# Patient Record
(demographics unavailable — no encounter records)

---

## 2024-10-23 NOTE — ASSESSMENT
[FreeTextEntry1] : Assessment and Plan: - Cough (R05) : Coughing is the chief complaint of the patient. It seems to be a recurrent symptom and might be related to her underlying respiratory issues. The patient's lack of significant smoking history and other symptoms makes it less likely that the cough is related to serious pulmonary conditions such as lung cancer. Her previous imaging study shows minor spots in the lungs, which may be associated with her diagnosed rheumatologic disorder, gematomyositis. - Therapeutic Interventions: Continue with regular use of Dulair and refill Albuterol prescription immediately. - Diagnostic Tests: A follow-up CAT scan has been suggested to be done in the coming months to assess changes in the lung nodules.  - Patient Education: Explained to patient the importance of keeping her medications updated, and the need for a follow-up CAT scan.  - Follow-Up: Appointment to be made in spring for regular follow-up. Patient has been advised to report any changes or wShe will f/u with cardiology.orsening of her condition immediately.

## 2024-10-23 NOTE — HISTORY OF PRESENT ILLNESS
[TextBox_4] : - Summary : The patient, Nita Epps, came in for a regular follow-up visit and is primarily complaining of persistent coughing. She does not report any significant nose congestion or other cold-like symptoms. - Chief Complaint (CC) : The main complaint of the patient is persistent coughing. She does not report any other significant respiratory or systemic symptoms at present. - History of Present Illness : Ms. Epps suffers from long-term respiratory issues. She has been using Dulair regularly, but recently ran out of albuterol (also known by the brand name Proventil). She reports being a bit 'under the weather,' mainly characterized by continued coughing. She denies having nasal congestion or other allergy-like symptoms. The patient also shares that she is diagnosed with a rheumatologic disorder, dermatomyositis. In a previous imaging study (CAT scan) performed in April, a few spots were noted in her lungs. The patient denies any history of significant smoking. - Past Medical History : The patient has a history of rheumatologic disorder, gematomyositis which might be related to her current respiratory issues. Further details pertaining to her past medical history are not provided within the provided transcription.

## 2024-11-01 NOTE — PHYSICAL EXAM
[Well Developed] : well developed [Well Nourished] : well nourished [No Acute Distress] : no acute distress [Normal Venous Pressure] : normal venous pressure [Normal] : clear lung fields, good air entry, no respiratory distress [Edema ___] : edema [unfilled]

## 2024-11-01 NOTE — ASSESSMENT
[FreeTextEntry1] : Patient has NYHA  class 3 symptoms. She is on three agents, and there was no evidence of RV dysfunction on TTE 10/2023. She reports feeling better compared to previous appt.  Recent RHC showed: PA 45/14/29 mmHg; PAWP 14 mmHg, CO/CI 9.6/4.6 (TD) with PVR 1.5 GALLO, no e/o L-R shunt. This doesn't explain her limitations and hypoxia. No significant e/o lung parenchymal disease on CT chest.    - Pulmonary Arterial Hypertension / Severe obstruction/ Obesity   WHO FC class III Reveal score 3, low risk  6 MWT 11/1- 187 meters SpO2 80% on 4 LPM 6MWT 6/21/2024 - 157 m in 4 minutes. Patient was unable to complete exam due to hypoxia and fatigue. 78% on 3L/min was lowest SaO2 with exertion.  6MWT  3/- 152 m in 4 minutes. Patient was unable to complete exam due to hypoxia and fatigue. 79% O2 on 6 L/min  Euvolemic on exam, IVC is 1.9 cm, collapsible Torsemide 20 mg once a week, with weight gain of 2 lbs take an extra dose.  Continue Uptravi 600 mg BID, unable to tolerate increase to 800 mg BID on two occasions in the past Continue Adempas 2.5 mg TID  Continue Opsumit 10 mg daily  PFTs with very severe obstruction, follow up with pulmonary  Going for CT chest per pulm to follow up on nodules reported on previous CT. Will discuss referral for lung transplant evaluation  Continue O2 therapy 24/7 Monitor blood pressure and weight at home Follow up with Pulm and rheumatology Referral to pulmonary rehab Blood today RTO in 2 months with labs   Dinora Mendez MD, FACC, [facility]  Advanced Heart Failure/ Mechanical Circulatory Support Pulmonary Hypertension and Cardiac Amyloidosis  [facility]

## 2024-11-01 NOTE — HISTORY OF PRESENT ILLNESS
[FreeTextEntry1] : 62 y/o F with h/o PAH, CAD s/p stent, obesity, COVID-19, sleep apnea, on home 02, coming today for follow up of pulmonary arterial HTN. She was admitted to [facility] from 9/16 to 9/26 for COPD exacerbation, she required intubation on 9/16 for CO2 retention, extubated 9/19. Here today for a follow-up.   Patient is coming for routine visit. RHC done 3/26/2024 showed: /79/104, RA 6, PA 45/14/29, PAWP 14, CO/CI 9.6/4.6, PVR 1.5.  Her Uptravi was increased to 60 0 mg BID since then which she started only a few days ago. She denies any side effects and reports a slight improvement in her breathing.   A recent CT chest done 4/6/2024 showed multiple bilateral pulmonary nodules- Patent central tracheobronchial tree, no e/o parenchymal mass or pleural effusion, no bronchiectasis or honeycoming. Repeat CT in 3 months recommended.  PFTs done March 2024 showed very severe obstruction with air trapping  Patient reports being stable at baseline, she walks around in her apartment with no major limitations. Her major difficulty is when she has to walk outside, especially walking uphill. No chest pain, LOC, N/V reported.   06/21/2024: Patient is here for F/U visit. States that her dyspnea has slightly improved. She walked today from the hospital's main entrance to [address] [place] [address] by herself on O2 and felt ok. She complains of occasional headaches from Uptravi, which improves with Tylenol. No c/o chest pain, LOC, N/V, bleeding. Her weight is stable 212-218 lbs, she takes furosemide 20 mg once a week and extra for weight gain of 2 lbs in one day. She is enrolled in pulmonary rehab and feels her ET has improved already; she has done 10 sessions. She is on 3 agents for PAH: Adempas 2.5 mg TID, Opsumit 10 mg daily and Uptravi 600 mg TID.  RHC done March 26th showed PA 45/14/29 mmHg; PAWP 14 mmHg, CO/CI 9.6/4.6 (TD) with PVR 1.5 GALLO; after this her uptravi was increased. Most recent labs: Cr 0.71, Hg 11.1/36.9, Mg 2.0 proBNP<36.   11/01/2024: Patient presents for follow up on management of pulmonary hypertension. She is on continuous O2 2L/min at rest and 3L/min with activity.  She walks around the house, uses a wheel[place]chair in shopping [place], unable to go uphill. She has been sick with common cold for the last week and stopped going to pulmonary rehab. Patient is willing to resume once she recovers from "cold". She had an episode of SOB last week which patient attributes to changes in weather, recovered with higher flow of O2 and rest. She tolerates Opsumit, Uptravi, Adempas well. Unable to uptitrate Uptravi further due to side effects. Her weight has been stable on Furosemide 20 mg three times a week. Euvolemic on exam.

## 2024-11-01 NOTE — REVIEW OF SYSTEMS
[Dyspnea on exertion] : dyspnea during exertion [Lower Ext Edema] : lower extremity edema [Negative] : Gastrointestinal

## 2024-11-01 NOTE — CARDIOLOGY SUMMARY
[de-identified] : V/Q scan - 01- - Low probability for PE \par  Rheumatologic work up: negative for SLE, Sjogren, Scleroderma, dermatomyositis \par  HIV negative \par  CT chest: No e/o ILD \par  RHC- 2/4/2020 \par  RA 9 mmHg \par  PA: 72/35/53 mmHg \par  PCWP 14 mmHg - saturation 84%- \par  CO/CI 5.73/3.41 \par  PVR: 6.8 GALLO \par  \par  Post Fran at 80 ppm \par  \par  PA 48/26/40\par  PCWP 16\par  CO/CI 6.6\par  PVR 3.6 GALLO

## 2025-02-07 NOTE — CARDIOLOGY SUMMARY
[de-identified] : V/Q scan - 01- - Low probability for PE \par  Rheumatologic work up: negative for SLE, Sjogren, Scleroderma, dermatomyositis \par  HIV negative \par  CT chest: No e/o ILD \par  RHC- 2/4/2020 \par  RA 9 mmHg \par  PA: 72/35/53 mmHg \par  PCWP 14 mmHg - saturation 84%- \par  CO/CI 5.73/3.41 \par  PVR: 6.8 GALLO \par  \par  Post Fran at 80 ppm \par  \par  PA 48/26/40\par  PCWP 16\par  CO/CI 6.6\par  PVR 3.6 GALLO

## 2025-02-07 NOTE — REVIEW OF SYSTEMS
[Dyspnea on exertion] : dyspnea during exertion [Lower Ext Edema] : lower extremity edema [Negative] : Neurological

## 2025-02-07 NOTE — HISTORY OF PRESENT ILLNESS
[FreeTextEntry1] : 64 y/o F with h/o PAH, CAD s/p stent, obesity, COVID-19, sleep apnea, on home 02, coming today for follow up of pulmonary arterial HTN. She was admitted to [facility] from 9/16 to 9/26 for COPD exacerbation, she required intubation on 9/16 for CO2 retention, extubated 9/19. Here today for a follow-up.   Patient is coming for routine visit. RHC done 3/26/2024 showed: /79/104, RA 6, PA 45/14/29, PAWP 14, CO/CI 9.6/4.6, PVR 1.5.  Her Uptravi was increased to 60 0 mg BID since then which she started only a few days ago. She denies any side effects and reports a slight improvement in her breathing.   A recent CT chest done 4/6/2024 showed multiple bilateral pulmonary nodules- Patent central tracheobronchial tree, no e/o parenchymal mass or pleural effusion, no bronchiectasis or honeycoming. Repeat CT in 3 months recommended.  PFTs done March 2024 showed very severe obstruction with air trapping  Patient reports being stable at baseline, she walks around in her apartment with no major limitations. Her major difficulty is when she has to walk outside, especially walking uphill. No chest pain, LOC, N/V reported.   06/21/2024: Patient is here for F/U visit. States that her dyspnea has slightly improved. She walked today from the hospital's main entrance to [address] [place] [address] by herself on O2 and felt ok. She complains of occasional headaches from Uptravi, which improves with Tylenol. No c/o chest pain, LOC, N/V, bleeding. Her weight is stable 212-218 lbs, she takes furosemide 20 mg once a week and extra for weight gain of 2 lbs in one day. She is enrolled in pulmonary rehab and feels her ET has improved already; she has done 10 sessions. She is on 3 agents for PAH: Adempas 2.5 mg TID, Opsumit 10 mg daily and Uptravi 600 mg TID.  RHC done March 26th showed PA 45/14/29 mmHg; PAWP 14 mmHg, CO/CI 9.6/4.6 (TD) with PVR 1.5 GALLO; after this her uptravi was increased. Most recent labs: Cr 0.71, Hg 11.1/36.9, Mg 2.0 proBNP<36.   11/01/2024: Patient presents for follow up on management of pulmonary hypertension. She is on continuous O2 2L/min at rest and 3L/min with activity.  She walks around the house, uses a wheel[place]chair in shopping [place], unable to go uphill. She has been sick with common cold for the last week and stopped going to pulmonary rehab. Patient is willing to resume once she recovers from "cold". She had an episode of SOB last week which patient attributes to changes in weather, recovered with higher flow of O2 and rest. She tolerates Opsumit, Uptravi, Adempas well. Unable to uptitrate Uptravi further due to side effects. Her weight has been stable on Furosemide 20 mg three times a week. Euvolemic on exam.  02/07/2025: Patient presents to follow up clinic for management of pulmonary arterial hypertension. She had viral infection 2 weeks ago which she has almost recovered from. She complains of short-lived episodes occurring 2-3 times a week when she starts coughing and can't catch her breath; after 2-3 minutes she is back to her normal. She has not resumed rehab yet due to multiple family events and recent illness. She has very limited physical activity, walks around the house. She is on O2 2-3L/min via NC, 4L/min with walking. She has not gone for chest CT yet. Hg 10.9 Cr 0.7 K 4.6 pro-BNP <36

## 2025-02-07 NOTE — ASSESSMENT
[FreeTextEntry1] : Patient has NYHA  class 3 symptoms. She is on three agents, and there was no evidence of RV dysfunction on TTE 10/2023. She reports feeling better compared to previous appt.  Recent RHC showed: PA 45/14/29 mmHg; PAWP 14 mmHg, CO/CI 9.6/4.6 (TD) with PVR 1.5 GALLO, no e/o L-R shunt. This doesn't explain her limitations and hypoxia. No significant e/o lung parenchymal disease on CT chest.    - Pulmonary Arterial Hypertension / Severe obstruction/ Obesity   WHO FC class III  6 MWT 11/1- 187 meters SpO2 80% on 4 LPM 6MWT 6/21/2024 - 157 m in 4 minutes. Patient was unable to complete exam due to hypoxia and fatigue. 78% on 3L/min was lowest SaO2 with exertion.  6MWT  3/- 152 m in 4 minutes. Patient was unable to complete exam due to hypoxia and fatigue. 79% O2 on 6 L/min 6MWT 02/07/2025- 142 m , patient walked for 5 min 10 sec with a stop of 1 min 10 sec, lowest SaO2 84% on 3L/min with exertion  Euvolemic on exam, IVC is <2.0cm, collapsible Continue Torsemide 20 mg PRN Continue Uptravi 600 mg BID, unable to tolerate increase to 800 mg BID on two occasions in the past. Will start another trial for higher dose after TTE Continue Adempas 2.5 mg TID  Continue Opsumit 10 mg daily  PFTs with very severe obstruction, follow up with pulmonary  Going for CT chest per pulm to follow up on nodules reported on previous CT. Continue O2 therapy 24/7 Monitor blood pressure and weight at home Follow up with Pulm and rheumatology Referral to pulmonary rehab Blood work TTE soon next appt  RTO in 3 months with labs   Dinora Mendez MD, FACC, [facility]  Advanced Heart Failure/ Mechanical Circulatory Support Pulmonary Hypertension and Cardiac Amyloidosis  [facility]

## 2025-02-07 NOTE — PHYSICAL EXAM
[Well Developed] : well developed [Well Nourished] : well nourished [No Acute Distress] : no acute distress [Normal Venous Pressure] : normal venous pressure [Normal] : clear lung fields, good air entry, no respiratory distress [Edema ___] : edema [unfilled] [Moves all extremities] : moves all extremities [Alert and Oriented] : alert and oriented [Normal memory] : normal memory

## 2025-02-14 NOTE — ASSESSMENT
[FreeTextEntry1] : Assessment and Plan: -  : Multiple pulmonary nodules. - Diagnostic Tests: advises a repeat CAT now scan to monitor pulmonary nodules. They may be related to the previous illness at the time of the previous scan v. rheum v. neoplasia  - : Sleep apnea possibly not well controlled. - Investigate if current CPAP settings need to be readjusted. - Possibly repeating the sleep test when pt agrees.  - : Fatigue, and eye related issues - likely related to rheum condition - f/u optho - Consult rheumatologist regarding the possibility of taking natural herbs. - Refer to dermatologist for further examination of eye issues.  - Possibly get a biopsy of her eyelids.    f/u after CT

## 2025-02-14 NOTE — REASON FOR VISIT
[Follow-Up] : a follow-up visit [COPD] : COPD [Bronchiectasis] : bronchiectasis [Pulmonary Nodules] : pulmonary nodules

## 2025-02-14 NOTE — HISTORY OF PRESENT ILLNESS
[TextBox_4] : - Summary : Patient presents again for follow-up of multiple ongoing issues. - Chief Complaint (CC) : The patient's main concern during this visit is general fatigue and concerns about her current medication. Patient is also due for a repeat CAT scan to evaluate previously detected pulmonary nodules. - History of Present Illness : Nita Epps, a patient with a history of COPD, dermatomyositis, and chronic oxygen therapy comes in today for a follow-up visit. Ms. Epps has previously diagnosed multiple pulmonary nodules and she is currently overdue for a repeat CAT scan. Last time when the scan was done, she was not feeling well with a respiratory condition. She has been generally feeling okay but reports increased fatigue. She also mentions concerns about her current medication and has been considering taking natural herbs,. Additionally, she mentions experiencing symptoms that may indicate issues with her current sleep apnea treatment. She is also experiencing eye issues, including reduced vision and continuous yellow mucus discharge.

## 2025-05-16 NOTE — ASSESSMENT
[FreeTextEntry1] : Patient has NYHA  class 3 symptoms. She is on three agents, and there was no evidence of RV dysfunction on TTE 10/2023. She reports feeling better compared to previous appt.  RHC 3/2024 showed: PA 45/14/29 mmHg; PAWP 14 mmHg, CO/CI 9.6/4.6 (TD) with PVR 1.5 GALLO, no e/o L-R shunt. This doesn't explain her limitations and hypoxia. No significant e/o lung parenchymal disease on CT chest.    - Pulmonary Arterial Hypertension / Severe obstruction/ Obesity   WHO FC class III  6 MWT 11/1- 187 meters SpO2 80% on 4 LPM 6MWT 6/21/2024 - 157 m in 4 minutes. Patient was unable to complete exam due to hypoxia and fatigue. 78% on 3L/min was lowest SaO2 with exertion.  6MWT  3/- 152 m in 4 minutes. Patient was unable to complete exam due to hypoxia and fatigue. 79% O2 on 6 L/min 6MWT 02/07/2025- 142 m , patient walked for 5 min 10 sec with a stop of 1 min 10 sec, lowest SaO2 84% on 3L/min with exertion   Patient is slightly overloaded on exam  Take furosemide daily for three days then continue with one tab weekly and extra as needed for weight gain of 2 lbs in one  day Continue Uptravi 600 mg BID, unable to tolerate increase to 800 mg BID on two occasions in the past. Will start another trial for higher dose after TTE Continue Adempas 2.5 mg TID  Continue Opsumit 10 mg daily  PFTs with very severe obstruction, follow up with pulmonary  Going for CT chest per pulm to follow up on nodules reported on previous CT. Continue O2 therapy 24/7 Monitor blood pressure and weight at home Follow up with Pulm and rheumatology Referral to pulmonary rehab She might benefit at this point from evaluation for lung transplant, she will think about it and discuss with her family RTO in 3 months with labs   Dinora Mendez MD, FACC, [facility]  Advanced Heart Failure/ Mechanical Circulatory Support Pulmonary Hypertension and Cardiac Amyloidosis  [facility]

## 2025-05-16 NOTE — CARDIOLOGY SUMMARY
[de-identified] : V/Q scan - 01- - Low probability for PE \par  Rheumatologic work up: negative for SLE, Sjogren, Scleroderma, dermatomyositis \par  HIV negative \par  CT chest: No e/o ILD \par  RHC- 2/4/2020 \par  RA 9 mmHg \par  PA: 72/35/53 mmHg \par  PCWP 14 mmHg - saturation 84%- \par  CO/CI 5.73/3.41 \par  PVR: 6.8 GALLO \par  \par  Post Fran at 80 ppm \par  \par  PA 48/26/40\par  PCWP 16\par  CO/CI 6.6\par  PVR 3.6 GALLO

## 2025-05-16 NOTE — HISTORY OF PRESENT ILLNESS
[FreeTextEntry1] : 64 y/o F with h/o PAH, CAD s/p stent, obesity, COVID-19, sleep apnea, on home 02, coming today for follow up of pulmonary arterial HTN. She was admitted to [facility] from 9/16 to 9/26 for COPD exacerbation, she required intubation on 9/16 for CO2 retention, extubated 9/19. Here today for a follow-up.   Patient is coming for routine visit. RHC done 3/26/2024 showed: /79/104, RA 6, PA 45/14/29, PAWP 14, CO/CI 9.6/4.6, PVR 1.5.  Her Uptravi was increased to 60 0 mg BID since then which she started only a few days ago. She denies any side effects and reports a slight improvement in her breathing.   A recent CT chest done 4/6/2024 showed multiple bilateral pulmonary nodules- Patent central tracheobronchial tree, no e/o parenchymal mass or pleural effusion, no bronchiectasis or honeycoming. Repeat CT in 3 months recommended.  PFTs done March 2024 showed very severe obstruction with air trapping  Patient reports being stable at baseline, she walks around in her apartment with no major limitations. Her major difficulty is when she has to walk outside, especially walking uphill. No chest pain, LOC, N/V reported.   06/21/2024: Patient is here for F/U visit. States that her dyspnea has slightly improved. She walked today from the hospital's main entrance to [address] [place] [address] by herself on O2 and felt ok. She complains of occasional headaches from Uptravi, which improves with Tylenol. No c/o chest pain, LOC, N/V, bleeding. Her weight is stable 212-218 lbs, she takes furosemide 20 mg once a week and extra for weight gain of 2 lbs in one day. She is enrolled in pulmonary rehab and feels her ET has improved already; she has done 10 sessions. She is on 3 agents for PAH: Adempas 2.5 mg TID, Opsumit 10 mg daily and Uptravi 600 mg TID.  RHC done March 26th showed PA 45/14/29 mmHg; PAWP 14 mmHg, CO/CI 9.6/4.6 (TD) with PVR 1.5 GALLO; after this her uptravi was increased. Most recent labs: Cr 0.71, Hg 11.1/36.9, Mg 2.0 proBNP<36.   11/01/2024: Patient presents for follow up on management of pulmonary hypertension. She is on continuous O2 2L/min at rest and 3L/min with activity.  She walks around the house, uses a wheel[place] in [place], unable to go uphill. She has been sick with common cold for the last week and stopped going to pulmonary rehab. Patient is willing to resume once she recovers from "cold". She had an episode of SOB last week which patient attributes to changes in weather, recovered with higher flow of O2 and rest. She tolerates Opsumit, Uptravi, Adempas well. Unable to uptitrate Uptravi further due to side effects. Her weight has been stable on Furosemide 20 mg three times a week. Euvolemic on exam.  02/07/2025: Patient presents to follow up clinic for management of pulmonary arterial hypertension. She had viral infection 2 weeks ago which she has almost recovered from. She complains of short-lived episodes occurring 2-3 times a week when she starts coughing and can't catch her breath; after 2-3 minutes she is back to her normal. She has not resumed rehab yet due to multiple family events and recent illness. She has very limited physical activity, walks around the house. She is on O2 2-3L/min via NC, 4L/min with walking. She has not gone for chest CT yet. Hg 10.9 Cr 0.7 K 4.6 pro-BNP <36  05/14/2025: Follow up clinic for PAH. She reports some worsening in ET and dyspnea with exertion. TTE from 02/18/2025 showed an improvement in PASP 20 mmHg, euvolemic state and preserved EF. Today, however, patient is hypervolemic, she admits to eating take out yesterday. IVC 2.2 cm collapsing <50%. She gained weight over the last 2 weeks, 8 lbs weight gain since last visit. She takes Furosemide 20 mg weekly, last dose 6 days ago.  No recent blood work. On O2 continuously 3L/min. She needs a motorized wheelchair because her condition is getting worse and her  is not able to push a regular wheelchair due to musculoskeletal issues.

## 2025-05-16 NOTE — HISTORY OF PRESENT ILLNESS
[FreeTextEntry1] : 62 y/o F with h/o PAH, CAD s/p stent, obesity, COVID-19, sleep apnea, on home 02, coming today for follow up of pulmonary arterial HTN. She was admitted to [facility] from 9/16 to 9/26 for COPD exacerbation, she required intubation on 9/16 for CO2 retention, extubated 9/19. Here today for a follow-up.   Patient is coming for routine visit. RHC done 3/26/2024 showed: /79/104, RA 6, PA 45/14/29, PAWP 14, CO/CI 9.6/4.6, PVR 1.5.  Her Uptravi was increased to 60 0 mg BID since then which she started only a few days ago. She denies any side effects and reports a slight improvement in her breathing.   A recent CT chest done 4/6/2024 showed multiple bilateral pulmonary nodules- Patent central tracheobronchial tree, no e/o parenchymal mass or pleural effusion, no bronchiectasis or honeycoming. Repeat CT in 3 months recommended.  PFTs done March 2024 showed very severe obstruction with air trapping  Patient reports being stable at baseline, she walks around in her apartment with no major limitations. Her major difficulty is when she has to walk outside, especially walking uphill. No chest pain, LOC, N/V reported.   06/21/2024: Patient is here for F/U visit. States that her dyspnea has slightly improved. She walked today from the hospital's main entrance to [address] [place] [address] by herself on O2 and felt ok. She complains of occasional headaches from Uptravi, which improves with Tylenol. No c/o chest pain, LOC, N/V, bleeding. Her weight is stable 212-218 lbs, she takes furosemide 20 mg once a week and extra for weight gain of 2 lbs in one day. She is enrolled in pulmonary rehab and feels her ET has improved already; she has done 10 sessions. She is on 3 agents for PAH: Adempas 2.5 mg TID, Opsumit 10 mg daily and Uptravi 600 mg TID.  RHC done March 26th showed PA 45/14/29 mmHg; PAWP 14 mmHg, CO/CI 9.6/4.6 (TD) with PVR 1.5 GALLO; after this her uptravi was increased. Most recent labs: Cr 0.71, Hg 11.1/36.9, Mg 2.0 proBNP<36.   11/01/2024: Patient presents for follow up on management of pulmonary hypertension. She is on continuous O2 2L/min at rest and 3L/min with activity.  She walks around the house, [place] a wheel[place] in [place], unable to go uphill. She has been sick with common cold for the last week and stopped going to pulmonary rehab. Patient is willing to resume once she recovers from "cold". She had an episode of SOB last week which patient attributes to changes in weather, recovered with higher flow of O2 and rest. She tolerates Opsumit, Uptravi, Adempas well. Unable to uptitrate Uptravi further due to side effects. Her weight has been stable on Furosemide 20 mg three times a week. Euvolemic on exam.  02/07/2025: Patient presents to follow up clinic for management of pulmonary arterial hypertension. She had viral infection 2 weeks ago which she has almost recovered from. She complains of short-lived episodes occurring 2-3 times a week when she starts coughing and can't catch her breath; after 2-3 minutes she is back to her normal. She has not resumed rehab yet due to multiple family events and recent illness. She has very limited physical activity, walks around the house. She is on O2 2-3L/min via NC, 4L/min with walking. She has not gone for chest CT yet. Hg 10.9 Cr 0.7 K 4.6 pro-BNP <36  05/14/2025: Follow up clinic for PAH. She reports some worsening in ET and dyspnea with exertion. TTE from 02/18/2025 showed an improvement in PASP 20 mmHg, euvolemic state and preserved EF. Today, however, patient is hypervolemic, she admits to eating take out yesterday. IVC 2.2 cm collapsing <50%. She gained weight over the last 2 weeks, 8 lbs weight gain since last visit. She takes Furosemide 20 mg weekly, last dose 6 days ago.  No recent blood work. On O2 continuously 3L/min. She needs a motorized wheelchair because her condition is getting worse and her  is not able to push a regular wheelchair due to musculoskeletal issues.

## 2025-05-16 NOTE — ASSESSMENT
[FreeTextEntry1] : Patient has NYHA  class 3 symptoms. She is on three agents, and there was no evidence of RV dysfunction on TTE 10/2023. She reports feeling better compared to previous appt.  RHC 3/2024 showed: PA 45/14/29 mmHg; PAWP 14 mmHg, CO/CI 9.6/4.6 (TD) with PVR 1.5 GALLO, no e/o L-R shunt. This doesn't explain her limitations and hypoxia. No significant e/o lung parenchymal disease on CT chest.    - Pulmonary Arterial Hypertension / Severe obstruction/ Obesity   WHO FC class III  6 MWT 11/1- 187 meters SpO2 80% on 4 LPM 6MWT 6/21/2024 - 157 m in 4 minutes. Patient was unable to complete exam due to hypoxia and fatigue. 78% on 3L/min was lowest SaO2 with exertion.  6MWT  3/- 152 m in 4 minutes. Patient was unable to complete exam due to hypoxia and fatigue. 79% O2 on 6 L/min 6MWT 02/07/2025- 142 m , patient walked for 5 min 10 sec with a stop of 1 min 10 sec, lowest SaO2 84% on 3L/min with exertion   Patient is slightly overloaded on exam  Take furosemide daily for three days then continue with one tab weekly and extra as needed for weight gain of 2 lbs in one  day Continue Uptravi 600 mg BID, unable to tolerate increase to 800 mg BID on two occasions in the past. Will start another trial for higher dose after TTE Continue Adempas 2.5 mg TID  Continue Opsumit 10 mg daily  PFTs with very severe obstruction, follow up with pulmonary  Going for CT chest per pulm to follow up on nodules reported on previous CT. Continue O2 therapy 24/7 Monitor blood pressure and weight at home Follow up with Pulm and rheumatology Referral to pulmonary rehab She might benefit at this point from evaluation for lung transplant, she will think about it and discuss with her family RTO in 3 months with labs   Dinora Mendez MD, FACC, [facility]  Advanced Heart Failure/ Mechanical Circulatory Support Pulmonary Hypertension and Cardiac Amyloidosis  [facility]

## 2025-05-16 NOTE — CARDIOLOGY SUMMARY
[de-identified] : V/Q scan - 01- - Low probability for PE \par  Rheumatologic work up: negative for SLE, Sjogren, Scleroderma, dermatomyositis \par  HIV negative \par  CT chest: No e/o ILD \par  RHC- 2/4/2020 \par  RA 9 mmHg \par  PA: 72/35/53 mmHg \par  PCWP 14 mmHg - saturation 84%- \par  CO/CI 5.73/3.41 \par  PVR: 6.8 GALLO \par  \par  Post Fran at 80 ppm \par  \par  PA 48/26/40\par  PCWP 16\par  CO/CI 6.6\par  PVR 3.6 GALLO

## 2025-05-16 NOTE — CARDIOLOGY SUMMARY
[de-identified] : V/Q scan - 01- - Low probability for PE \par  Rheumatologic work up: negative for SLE, Sjogren, Scleroderma, dermatomyositis \par  HIV negative \par  CT chest: No e/o ILD \par  RHC- 2/4/2020 \par  RA 9 mmHg \par  PA: 72/35/53 mmHg \par  PCWP 14 mmHg - saturation 84%- \par  CO/CI 5.73/3.41 \par  PVR: 6.8 GALLO \par  \par  Post Fran at 80 ppm \par  \par  PA 48/26/40\par  PCWP 16\par  CO/CI 6.6\par  PVR 3.6 GALLO

## 2025-05-16 NOTE — HISTORY OF PRESENT ILLNESS
[FreeTextEntry1] : 62 y/o F with h/o PAH, CAD s/p stent, obesity, COVID-19, sleep apnea, on home 02, coming today for follow up of pulmonary arterial HTN. She was admitted to [facility] from 9/16 to 9/26 for COPD exacerbation, she required intubation on 9/16 for CO2 retention, extubated 9/19. Here today for a follow-up.   Patient is coming for routine visit. RHC done 3/26/2024 showed: /79/104, RA 6, PA 45/14/29, PAWP 14, CO/CI 9.6/4.6, PVR 1.5.  Her Uptravi was increased to 60 0 mg BID since then which she started only a few days ago. She denies any side effects and reports a slight improvement in her breathing.   A recent CT chest done 4/6/2024 showed multiple bilateral pulmonary nodules- Patent central tracheobronchial tree, no e/o parenchymal mass or pleural effusion, no bronchiectasis or honeycoming. Repeat CT in 3 months recommended.  PFTs done March 2024 showed very severe obstruction with air trapping  Patient reports being stable at baseline, she walks around in her apartment with no major limitations. Her major difficulty is when she has to walk outside, especially walking uphill. No chest pain, LOC, N/V reported.   06/21/2024: Patient is here for F/U visit. States that her dyspnea has slightly improved. She walked today from the hospital's main entrance to [address] [place] [address] by herself on O2 and felt ok. She complains of occasional headaches from Uptravi, which improves with Tylenol. No c/o chest pain, LOC, N/V, bleeding. Her weight is stable 212-218 lbs, she takes furosemide 20 mg once a week and extra for weight gain of 2 lbs in one day. She is enrolled in pulmonary rehab and feels her ET has improved already; she has done 10 sessions. She is on 3 agents for PAH: Adempas 2.5 mg TID, Opsumit 10 mg daily and Uptravi 600 mg TID.  RHC done March 26th showed PA 45/14/29 mmHg; PAWP 14 mmHg, CO/CI 9.6/4.6 (TD) with PVR 1.5 GALLO; after this her uptravi was increased. Most recent labs: Cr 0.71, Hg 11.1/36.9, Mg 2.0 proBNP<36.   11/01/2024: Patient presents for follow up on management of pulmonary hypertension. She is on continuous O2 2L/min at rest and 3L/min with activity.  She walks around the house, [place] in [place], unable to go uphill. She has been sick with common cold for the last week and stopped going to pulmonary rehab. Patient is willing to resume once she recovers from "cold". She had an episode of SOB last week which patient attributes to changes in weather, recovered with higher flow of O2 and rest. She tolerates Opsumit, Uptravi, Adempas well. Unable to uptitrate Uptravi further due to side effects. Her weight has been stable on Furosemide 20 mg three times a week. Euvolemic on exam.  02/07/2025: Patient presents to follow up clinic for management of pulmonary arterial hypertension. She had viral infection 2 weeks ago which she has almost recovered from. She complains of short-lived episodes occurring 2-3 times a week when she starts coughing and can't catch her breath; after 2-3 minutes she is back to her normal. She has not resumed rehab yet due to multiple family events and recent illness. She has very limited physical activity, walks around the house. She is on O2 2-3L/min via NC, 4L/min with walking. She has not gone for chest CT yet. Hg 10.9 Cr 0.7 K 4.6 pro-BNP <36  05/14/2025: Follow up clinic for PAH. She reports some worsening in ET and dyspnea with exertion. TTE from 02/18/2025 showed an improvement in PASP 20 mmHg, euvolemic state and preserved EF. Today, however, patient is hypervolemic, she admits to eating take out yesterday. IVC 2.2 cm collapsing <50%. She gained weight over the last 2 weeks, 8 lbs weight gain since last visit. She takes Furosemide 20 mg weekly, last dose 6 days ago.  No recent blood work. On O2 continuously 3L/min. She needs a motorized wheelchair because her condition is getting worse and her  is not able to push a regular wheelchair due to musculoskeletal issues.